# Patient Record
Sex: MALE | Race: WHITE | NOT HISPANIC OR LATINO
[De-identification: names, ages, dates, MRNs, and addresses within clinical notes are randomized per-mention and may not be internally consistent; named-entity substitution may affect disease eponyms.]

---

## 2017-07-06 ENCOUNTER — APPOINTMENT (OUTPATIENT)
Dept: MRI IMAGING | Facility: IMAGING CENTER | Age: 27
End: 2017-07-06

## 2017-07-06 ENCOUNTER — OUTPATIENT (OUTPATIENT)
Dept: OUTPATIENT SERVICES | Facility: HOSPITAL | Age: 27
LOS: 1 days | End: 2017-07-06
Payer: COMMERCIAL

## 2017-07-06 DIAGNOSIS — Z00.8 ENCOUNTER FOR OTHER GENERAL EXAMINATION: ICD-10-CM

## 2017-07-06 PROCEDURE — 70551 MRI BRAIN STEM W/O DYE: CPT

## 2019-04-12 ENCOUNTER — APPOINTMENT (OUTPATIENT)
Dept: ORTHOPEDIC SURGERY | Facility: CLINIC | Age: 29
End: 2019-04-12
Payer: COMMERCIAL

## 2019-04-12 DIAGNOSIS — M25.462 EFFUSION, LEFT KNEE: ICD-10-CM

## 2019-04-12 DIAGNOSIS — S80.02XA CONTUSION OF LEFT KNEE, INITIAL ENCOUNTER: ICD-10-CM

## 2019-04-12 PROCEDURE — 73562 X-RAY EXAM OF KNEE 3: CPT | Mod: LT

## 2019-04-12 PROCEDURE — 99204 OFFICE O/P NEW MOD 45 MIN: CPT
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
## 2019-04-12 NOTE — CONSULT LETTER
[Dear  ___] : Dear  [unfilled], [Please see my note below.] : Please see my note below. [Consult Letter:] : I had the pleasure of evaluating your patient, [unfilled]. [Consult Closing:] : Thank you very much for allowing me to participate in the care of this patient.  If you have any questions, please do not hesitate to contact me. [Sincerely,] : Sincerely, [FreeTextEntry3] : Dr. Benito Garland\par

## 2019-04-12 NOTE — PHYSICAL EXAM
[Normal RUE] : Right Upper Extremity: No scars, rashes, lesions, ulcers, skin intact [Normal Finger/nose] : finger to nose coordination [Normal Touch] : sensation intact for touch [Normal] : no peripheral adenopathy appreciated [de-identified] : Patient appears stated age in no acute respiratory distress. Patient is alert oriented x3. Patient has normal mood and affect. Gait examination is normal\par Bilateral knee exam\par Range of motion of the knee is 0-120°. \par Skin is normal.  No rash.\par There is no effusion. No medial or lateral joint line tenderness. No swelling, no pitting edema.\par Overall alignment of the knee is then slight varus. Good anterior posterior stability. Firm endpoints on anterior and posterior drawer. \par Medial lateral stability is intact. Firm endpoint on medial and lateral stress testing .\par John Paul test is negative.\par Quadriceps strength 5/ 5. There is no loss of muscle volume in the thigh. \par Good anterior posterior and mediolateral stability.\par Sensation in the extremities intact. \par Discrimination is intact. Good DP and PT pulses.\par 		On the left knee there is a large abrasion 5 by 5 cm² and some redness\par No effusion in the knee\par \par Bilateral hip exam\par On inspection of the hip shows skin is normal. No evidence of rash. \par No loss of muscle.  Abductor strength is 5 out of 5. Hip flexor strength is 5.\par Range of motion of the hip at 90° flexion internal rotation is 15° external rotation is 30° pain-free. \par Hip has good stability in anterior and posterior direction. \par On lateral decubitus  examination there is no tenderness in the greater trochanter. \par Lower Extremity Examination \par Bilateral lower extremity skin is normal. There is no rash. There is no edema and lymphadenopathy.  DP and PT pulses intact. Sensation is intact.\par  [de-identified] : X-rays of the left knee through shows well-preserved joint space no bony abnormality

## 2019-04-12 NOTE — DISCUSSION/SUMMARY
[de-identified] : Patient has a large abrasion on the left knee. This is consistent with the abrasion and skin tear. No bony abnormalities no gross fracture continue activity as tolerated keep the skin dressing dry. Nonadhesive Telfa dressing given.\par \par If the knee gets swollen or signs of infection patient is advised to return to the office.

## 2019-07-22 ENCOUNTER — APPOINTMENT (OUTPATIENT)
Dept: OPHTHALMOLOGY | Facility: CLINIC | Age: 29
End: 2019-07-22
Payer: COMMERCIAL

## 2019-07-22 ENCOUNTER — NON-APPOINTMENT (OUTPATIENT)
Age: 29
End: 2019-07-22

## 2019-07-22 PROCEDURE — 92004 COMPRE OPH EXAM NEW PT 1/>: CPT

## 2020-07-16 ENCOUNTER — TRANSCRIPTION ENCOUNTER (OUTPATIENT)
Age: 30
End: 2020-07-16

## 2021-07-09 ENCOUNTER — EMERGENCY (EMERGENCY)
Facility: HOSPITAL | Age: 31
LOS: 1 days | Discharge: HOME | End: 2021-07-09
Attending: EMERGENCY MEDICINE | Admitting: EMERGENCY MEDICINE
Payer: COMMERCIAL

## 2021-07-09 VITALS
HEART RATE: 64 BPM | TEMPERATURE: 98 F | RESPIRATION RATE: 18 BRPM | HEIGHT: 73 IN | DIASTOLIC BLOOD PRESSURE: 64 MMHG | WEIGHT: 233.03 LBS | SYSTOLIC BLOOD PRESSURE: 115 MMHG | OXYGEN SATURATION: 97 %

## 2021-07-09 VITALS
RESPIRATION RATE: 18 BRPM | OXYGEN SATURATION: 97 % | SYSTOLIC BLOOD PRESSURE: 123 MMHG | HEART RATE: 84 BPM | DIASTOLIC BLOOD PRESSURE: 75 MMHG

## 2021-07-09 DIAGNOSIS — G43.909 MIGRAINE, UNSPECIFIED, NOT INTRACTABLE, WITHOUT STATUS MIGRAINOSUS: ICD-10-CM

## 2021-07-09 DIAGNOSIS — Z88.0 ALLERGY STATUS TO PENICILLIN: ICD-10-CM

## 2021-07-09 DIAGNOSIS — R11.2 NAUSEA WITH VOMITING, UNSPECIFIED: ICD-10-CM

## 2021-07-09 DIAGNOSIS — R51.9 HEADACHE, UNSPECIFIED: ICD-10-CM

## 2021-07-09 PROCEDURE — 96374 THER/PROPH/DIAG INJ IV PUSH: CPT

## 2021-07-09 PROCEDURE — 99284 EMERGENCY DEPT VISIT MOD MDM: CPT

## 2021-07-09 PROCEDURE — 96375 TX/PRO/DX INJ NEW DRUG ADDON: CPT

## 2021-07-09 RX ORDER — KETOROLAC TROMETHAMINE 30 MG/ML
15 SYRINGE (ML) INJECTION ONCE
Refills: 0 | Status: DISCONTINUED | OUTPATIENT
Start: 2021-07-09 | End: 2021-07-09

## 2021-07-09 RX ORDER — SODIUM CHLORIDE 9 MG/ML
1000 INJECTION INTRAMUSCULAR; INTRAVENOUS; SUBCUTANEOUS ONCE
Refills: 0 | Status: COMPLETED | OUTPATIENT
Start: 2021-07-09 | End: 2021-07-09

## 2021-07-09 RX ORDER — METOCLOPRAMIDE HCL 10 MG
10 TABLET ORAL ONCE
Refills: 0 | Status: COMPLETED | OUTPATIENT
Start: 2021-07-09 | End: 2021-07-09

## 2021-07-09 RX ADMIN — Medication 15 MILLIGRAM(S): at 02:06

## 2021-07-09 RX ADMIN — Medication 15 MILLIGRAM(S): at 02:38

## 2021-07-09 RX ADMIN — SODIUM CHLORIDE 1000 MILLILITER(S): 9 INJECTION INTRAMUSCULAR; INTRAVENOUS; SUBCUTANEOUS at 02:38

## 2021-07-09 RX ADMIN — Medication 10 MILLIGRAM(S): at 02:08

## 2021-07-09 RX ADMIN — SODIUM CHLORIDE 1000 MILLILITER(S): 9 INJECTION INTRAMUSCULAR; INTRAVENOUS; SUBCUTANEOUS at 02:06

## 2021-07-09 NOTE — ED PROVIDER NOTE - OBJECTIVE STATEMENT
29yo M hx of migraines (has seen neuro in past, had imaging that was neg, well controlled with few episodes usually aborted w/ ibuprofen) here w/ N/V and typical migraine today. States did not eat all day, then overate at dinner, felt nauseous, started vomiting, and then triggered a migraine. took ibuprofen but vomited it back up within 20 minutes. migraine like typical just worse w/ nausea today. no recent head trauma, no neuro symptoms, no abdominal pain or diarrhea.

## 2021-07-09 NOTE — ED ADULT TRIAGE NOTE - CHIEF COMPLAINT QUOTE
headache, nausea and vomited twice prior to ed, symptoms started after dinner and snacks two and half hours ago

## 2021-07-09 NOTE — ED PROVIDER NOTE - CLINICAL SUMMARY MEDICAL DECISION MAKING FREE TEXT BOX
migraine cocktail given w/ total resolution of symptoms, pt feels back to baseline. DC home in NAD with strict return precautions given.

## 2021-07-09 NOTE — ED PROVIDER NOTE - PATIENT PORTAL LINK FT
You can access the FollowMyHealth Patient Portal offered by [facility] by registering at the following website: [email or web address]. By joining [employer]’s FollowMyHealth portal, you will also be able to view your health information using other applications (apps) compatible with our system.

## 2021-07-09 NOTE — ED PROVIDER NOTE - NSFOLLOWUPINSTRUCTIONS_ED_ALL_ED_FT
Migraine Headache    A migraine headache is a very strong throbbing pain on one side or both sides of your head. Migraines can also cause other symptoms. Talk with your doctor about what things may bring on (trigger) your migraine headaches.    Follow these instructions at home:  Medicines     Take over-the-counter and prescription medicines only as told by your doctor.  Do not drive or use heavy machinery while taking prescription pain medicine.  To prevent or treat constipation while you are taking prescription pain medicine, your doctor may recommend that you:  Drink enough fluid to keep your pee (urine) clear or pale yellow.  Take over-the-counter or prescription medicines.  Eat foods that are high in fiber. These include fresh fruits and vegetables, whole grains, and beans.  Limit foods that are high in fat and processed sugars. These include fried and sweet foods.  Lifestyle     Avoid alcohol.  Do not use any products that contain nicotine or tobacco, such as cigarettes and e-cigarettes. If you need help quitting, ask your doctor.  Get at least 8 hours of sleep every night.  Limit your stress.  General instructions     Image   Keep a journal to find out what may bring on your migraines. For example, write down:  What you eat and drink.  How much sleep you get.  Any change in what you eat or drink.  Any change in your medicines.  If you have a migraine:  Avoid things that make your symptoms worse, such as bright lights.  It may help to lie down in a dark, quiet room.  Do not drive or use heavy machinery.  Ask your doctor what activities are safe for you.  Keep all follow-up visits as told by your doctor. This is important.  Contact a doctor if:  You get a migraine that is different or worse than your usual migraines.  Get help right away if:  Your migraine gets very bad.  You have a fever.  You have a stiff neck.  You have trouble seeing.  Your muscles feel weak or like you cannot control them.  You start to lose your balance a lot.  You start to have trouble walking.  You pass out (faint).  This information is not intended to replace advice given to you by your health care provider. Make sure you discuss any questions you have with your health care provider.    Document Released: 09/26/2009 Document Revised: 07/07/2017 Document Reviewed: 06/05/2017  [employer] Interactive Patient Education © 2019 [employer]     Nausea / Vomiting    Nausea is the feeling that you have to vomit. As nausea gets worse, it can lead to vomiting. Vomiting puts you at an increased risk for dehydration. Older adults and people with other diseases or a weak immune system are at higher risk for dehydration. Drink clear fluids in small but frequent amounts as tolerated. Eat bland, easy-to-digest foods in small amounts as tolerated.    SEEK IMMEDIATE MEDICAL CARE IF YOU HAVE ANY OF THE FOLLOWING SYMPTOMS: fever, inability to keep sufficient fluids down, black or bloody vomitus, black or bloody stools, lightheadedness/dizziness, chest pain, severe headache, rash, shortness of breath, cold or clammy skin, confusion, pain with urination, or any signs of dehydration.

## 2021-07-09 NOTE — ED ADULT TRIAGE NOTE - PAIN RATING/NUMBER SCALE (0-10): ACTIVITY
CTICU  CRITICAL  CARE  attending     Hand off received 					   Pertinent clinical, laboratory, radiographic, hemodynamic, echocardiographic, respiratory data, microbiologic data and chart were reviewed and analyzed frequently throughout the course of the day and night  Patient seen and examined with CTS/ SH attending at bedside  Pt is a 63y , Male, post op s/p CABG (coronary artery bypass graft)   x 3 (LIMA to LAD, SVG to PDA, SVG to Diag), Aorto to Right subclavian bypass, Stapling and divinding of Right subclavian artery. 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    Intraop:    4 PRBC; 1 platelets; 1 FFP  5 units Cryo    ICU    intubated/sedated  Levo/Vaso  Full vent support      Coronary artery disease: Coronary artery disease  HTN (hypertension): HTN (hypertension)  Dysphagia: Dysphagia      , FAMILY HISTORY:  Family history of breast cancer (Sibling)  Family history of malignant neoplasm of ovary (Mother)  Family history of pancreatic cancer (Father)  PAST MEDICAL & SURGICAL HISTORY:  Coronary artery disease  Dysphagia  HLD (hyperlipidemia)  Stroke  Diabetes  HTN (hypertension)  H/O hernia repair: At age 6 months.  S/P cataract surgery  S/P Botox injection: to [facility]    Patient is a 63y old  Male who presents with a chief complaint of dysphagia and aberrant right subclavian artery (09 Jul 2018 17:43)      14 system review was unremarkable  acute changes include acute respiratory failure  Vital signs, hemodynamic and respiratory parameters were reviewed from the bedside nursing flowsheet.  ICU Vital Signs Last 24 Hrs  T(C): 36.4 (10 Jul 2018 16:30), Max: 36.6 (10 Jul 2018 01:00)  T(F): 97.5 (10 Jul 2018 16:30), Max: 97.8 (10 Jul 2018 01:00)  HR: 86 (10 Jul 2018 17:30) (54 - 92)  BP: 106/57 (10 Jul 2018 16:45) (106/57 - 145/76)  BP(mean): 82 (10 Jul 2018 16:45) (82 - 104)  ABP: 112/54 (10 Jul 2018 17:30) (102/56 - 140/74)  ABP(mean): 70 (10 Jul 2018 17:30) (68 - 98)  RR: 9 (10 Jul 2018 17:30) (9 - 18)  SpO2: 100% (10 Jul 2018 17:30) (97% - 100%)    Adult Advanced Hemodynamics Last 24 Hrs  CVP(mm Hg): 5 (10 Jul 2018 17:30) (5 - 8)  CVP(cm H2O): --  CO: --  CI: --  PA: --  PA(mean): --  PCWP: --  SVR: --  SVRI: --  PVR: --  PVRI: --, ABG - ( 10 Jul 2018 16:17 )  pH, Arterial: 7.50  pH, Blood: x     /  pCO2: 35    /  pO2: 130   / HCO3: 27    / Base Excess: 3.5   /  SaO2: 98                Mode: AC/ CMV (Assist Control/ Continuous Mandatory Ventilation)  RR (machine): 10  TV (machine): 600  FiO2: 50  PEEP: 5  ITime: 1  MAP: 7  PIP: 20    Intake and output was reviewed and the fluid balance was calculated  Daily     Daily   I&O's Summary    09 Jul 2018 07:01  -  10 Jul 2018 07:00  --------------------------------------------------------  IN: 440 mL / OUT: 850 mL / NET: -410 mL    10 Jul 2018 07:01  -  10 Jul 2018 18:08  --------------------------------------------------------  IN: 1554.4 mL / OUT: 690 mL / NET: 864.4 mL        All lines and drain sites were assessed  Glycemic trend was reviewedCAPILLARY BLOOD GLUCOSE      POCT Blood Glucose.: 120 mg/dL (10 Jul 2018 05:47)    No acute change in mental status  Auscultation of the chest reveals equal bs  Abdomen is soft  Extremities are warm and well perfused  Wounds appear clean and unremarkable  Antibiotics are periop    labs  CBC Full  -  ( 10 Jul 2018 16:20 )  WBC Count : 13.9 K/uL  Hemoglobin : 11.9 g/dL  Hematocrit : 34.4 %  Platelet Count - Automated : 208 K/uL  Mean Cell Volume : 88.2 fL  Mean Cell Hemoglobin : 30.5 pg  Mean Cell Hemoglobin Concentration : 34.6 g/dL  Auto Neutrophil # : x  Auto Lymphocyte # : x  Auto Monocyte # : x  Auto Eosinophil # : x  Auto Basophil # : x  Auto Neutrophil % : 86.1 %  Auto Lymphocyte % : 7.1 %  Auto Monocyte % : 6.5 %  Auto Eosinophil % : 0.2 %  Auto Basophil % : 0.1 %    07-10    138  |  98  |  21  ----------------------------<  118<H>  4.4   |  31  |  0.52    Ca    8.8      10 Jul 2018 06:27  Phos  3.1     07-10  Mg     1.9     07-10    TPro  6.1  /  Alb  3.4  /  TBili  0.2  /  DBili  x   /  AST  19  /  ALT  13  /  AlkPhos  54  07-10    PT/INR - ( 10 Jul 2018 16:20 )   PT: 13.3 sec;   INR: 1.19          PTT - ( 10 Jul 2018 16:20 )  PTT:40.7 sec  The current medications were reviewed   MEDICATIONS  (STANDING):  aspirin enteric coated 81 milliGRAM(s) Oral daily  atorvastatin 80 milliGRAM(s) Oral at bedtime  clevidipine Infusion 2 mG/Hr (4 mL/Hr) IV Continuous <Continuous>  dexmedetomidine Infusion 0.4 MICROgram(s)/kG/Hr (5.74 mL/Hr) IV Continuous <Continuous>  dextrose 50% Injectable 50 milliLiter(s) IV Push every 15 minutes  dextrose 50% Injectable 25 milliLiter(s) IV Push every 15 minutes  fentaNYL    Injectable 25 MICROGram(s) IV Push once  heparin  Injectable 5000 Unit(s) SubCutaneous every 8 hours  insulin Infusion 2 Unit(s)/Hr (2 mL/Hr) IV Continuous <Continuous>  magnesium sulfate  IVPB 2 Gram(s) IV Intermittent once  pantoprazole  Injectable 40 milliGRAM(s) IV Push daily  propofol Infusion 10 MICROgram(s)/kG/Min (3.444 mL/Hr) IV Continuous <Continuous>  repaglinide 1 milliGRAM(s) Oral three times a day  sodium chloride 0.9%. 1000 milliLiter(s) (10 mL/Hr) IV Continuous <Continuous>  vancomycin  IVPB 1000 milliGRAM(s) IV Intermittent every 12 hours  vasopressin Infusion 0.033 Unit(s)/Min (2 mL/Hr) IV Continuous <Continuous>    MEDICATIONS  (PRN):       PROBLEM LIST/ ASSESSMENT:  HEALTH ISSUES - PROBLEM Dx:    Hemodynamic instability  s/p CABG  s/p Aorto-Right Subclavian bypass  Coronary artery disease: Coronary artery disease  HTN (hypertension): HTN (hypertension)  Dysphagia: Dysphagia      ,   Patient is a 63y old  Male who presents with a chief complaint of dysphagia and aberrant right subclavian artery (09 Jul 2018 17:43)     s/p CABG (coronary artery bypass graft)   x 3 (LIMA to LAD, SVG to PDA, SVG to Diag), Aorto to Right subclavian bypass, Stapling and divinding of Right subclavian artery. Esophagogastroduodenoscopy    My plan includes :  close hemodynamic, ventilatory and drain monitoring and management per post op routine    Monitor for arrhythmias and monitor parameters for organ perfusion  monitor neurologic status  Head of the bed should remain elevated to 45 deg .   chest PT and IS will be encouraged  monitor adequacy of oxygenation and ventilation and attempt to wean oxygen  Nutritional goals will be met using po eventually , ensure adequate caloric intake and montior the same  Stress ulcer and VTE prophylaxis will be achieved    Glycemic control is satisfactory  Electrolytes have been repleted as necessary and wound care has been carried out. Pain control has been achieved.   agressive physical therapy and early mobility and ambulation goals will be met   The family was updated about the course and plan  CRITICAL CARE TIME SPENT in evaluation and management, reassessments, review and interpretation of labs and x-rays, ventilator and hemodynamic management, formulating a plan and coordinating care: ___90____ MIN.  Time does not include procedural time.  CTICU ATTENDING     					    Jason Marc MD 5

## 2021-07-09 NOTE — ED ADULT NURSE NOTE - OBJECTIVE STATEMENT
pt c/o headache with 2 episodes of vomiting tonight.   pt has hx of migraines normally managed with motrin successfully.  has seen Neuro in past with negative work up.   states tonight he had a large dinner after not eating much lately due to stress.

## 2021-07-09 NOTE — ED PROVIDER NOTE - PHYSICAL EXAMINATION
CONSTITUTIONAL: Well-appearing; well-nourished; in no apparent distress.   HEAD: Normocephalic; atraumatic.   EYES:  conjunctiva and sclera clear  ENT: normal nose; no rhinorrhea;  NECK: Supple; full ROM  RESPIRATORY: Breathing easily; no resp difficulty  EXT: No cyanosis or edema;  SKIN: Normal for age and race; warm; dry; good turgor; no apparent lesions or rash.   NEURO: A & O x 3; face symmetric; grossly unremarkable.   PSYCHOLOGICAL: The patient’s mood and manner are appropriate. CONSTITUTIONAL: Well-appearing; well-nourished; in no apparent distress.   HEAD: Normocephalic; atraumatic.   EYES:  conjunctiva and sclera clear  ENT: normal nose; no rhinorrhea;  NECK: Supple; full ROM  RESPIRATORY: Breathing easily; no resp difficulty  GI: soft, non tender, non distended  EXT: No cyanosis or edema;  SKIN: Normal for age and race; warm; dry; good turgor; no apparent lesions or rash.   NEURO: A & O x 3; face symmetric; grossly unremarkable.   PSYCHOLOGICAL: The patient’s mood and manner are appropriate.

## 2021-07-09 NOTE — ED PROVIDER NOTE - DISPOSITION TYPE
DISCHARGE Quality 110: Preventive Care And Screening: Influenza Immunization: Influenza Immunization Administered during Influenza season Quality 402: Tobacco Use And Help With Quitting Among Adolescents: Patient screened for tobacco and never smoked Quality 130: Documentation Of Current Medications In The Medical Record: Current Medications Documented Quality 474: Zoster Vaccination Status: Shingrix Vaccination Administered or Previously Received Detail Level: Detailed Quality 226: Preventive Care And Screening: Tobacco Use: Screening And Cessation Intervention: Patient screened for tobacco use and is an ex/non-smoker Quality 131: Pain Assessment And Follow-Up: Pain assessment using a standardized tool is documented as negative, no follow-up plan required

## 2024-10-23 ENCOUNTER — NON-APPOINTMENT (OUTPATIENT)
Age: 34
End: 2024-10-23

## 2024-10-23 ENCOUNTER — APPOINTMENT (OUTPATIENT)
Dept: OPHTHALMOLOGY | Facility: CLINIC | Age: 34
End: 2024-10-23
Payer: COMMERCIAL

## 2024-10-23 PROCEDURE — 92285 EXTERNAL OCULAR PHOTOGRAPHY: CPT

## 2024-10-23 PROCEDURE — 99204 OFFICE O/P NEW MOD 45 MIN: CPT | Mod: 25
